# Patient Record
Sex: FEMALE | Race: WHITE | URBAN - METROPOLITAN AREA
[De-identification: names, ages, dates, MRNs, and addresses within clinical notes are randomized per-mention and may not be internally consistent; named-entity substitution may affect disease eponyms.]

---

## 2022-07-22 NOTE — IMPRESSION/PLAN
Impression: Tear film insufficiency of bilateral lacrimal glands: H04.123. Plan: Tearing and burning related to dryness. Recommend AT's at least 2-3 times per day or more OU.

## 2022-07-22 NOTE — IMPRESSION/PLAN
Impression: Bilateral nonexudative age-related macular degeneration, intermediate dry stage: H35.3132. *Previous history of injections Plan: Educated patient on condition and findings. Drusen/ RPE changes, no SRF OU. MAC OCT taken today. Continue AREDS vitamins. Recommend UV blocking sunglasses when outdoors, avoid smoking, and incorporating green leafy vegetables into diet. RTC with any changes/worsening in vision. Monitor every 6 months with MOCT/FUNDUS PHOTOS.

## 2022-07-22 NOTE — IMPRESSION/PLAN
Impression: History of falling: Z91.81. Plan: Patient reports history of falling and occasional feeling dizzy when standing up. Reports recent changes in medication. Discussed possible etiologies / BP? No ocular etiology noted that accounts for complaints. Symptoms are not eye or vision related. Recommend follow up with PCP for further evaluation to ensure not a side effect from medications vs. BP vs. other etiologies.

## 2023-01-31 NOTE — IMPRESSION/PLAN
Impression: Exdtve age-rel mclr degn, right eye, with actv chrdl neovas: H35.3211. *History of injections Plan: Educated patient on today's findings. Wet AMD noted today OD. MAC OCT taken today. Continue AREDS vitamins. Recommend UV blocking sunglasses when outdoors, avoid smoking, and incorporating green leafy vegetables into diet. Discussed the need to refer to retina for further evaluation. Patient to contact our office with any further changes, worsening or distortion in vision. 

Refer to retina for further evaluation

## 2023-01-31 NOTE — IMPRESSION/PLAN
Impression: Nexdtve age-related mclr degn, left eye, intermed dry stage: H35.3122. Plan: Drusen/ RPE changes, no SRF OS. MAC OCT taken today. Continue AREDS vitamins. Recommend UV blocking sunglasses when outdoors, avoid smoking, and incorporating green leafy vegetables into diet. Patient to contact our office with any changes, worsening or distortion in vision. Monitor with MAC OCT + PHOTOS.

## 2023-01-31 NOTE — IMPRESSION/PLAN
Impression: Vitreous membranes and strands, bilateral: H43.313. Plan: Retina is attached 360 degrees with no signs of any retinal holes, tears, or detachments observed on today's dilated examination. Patient to RTC ASAP if increase in floaters, flashes, or curtain or veil taking away vision. Monitor.

## 2023-02-07 NOTE — IMPRESSION/PLAN
Impression: Exudative age-related macular degeneration, right eye, with active choroidal neovascularization: H35.6628. Plan: Active CNVM OD - start [id number] avastin OD Avastin 1/3 OD administered today without complication. R/B/A discussed at length.  Follow-up in 4 weeks for avastin 2/3 OD

## 2023-02-07 NOTE — IMPRESSION/PLAN
Impression: Nexdtve age-related mclr degn, left eye, intermed dry stage: H35.3122. Plan: No evidence of CNVM on imaging or examination. Will observe for now. Return precautions emphasized. If any distortions or changes in vision, to call immediately. Amsler grid given Smoking cessation (not a smoker), AREDS2 vitamins, sunglasses on bright days.

## 2023-03-07 NOTE — IMPRESSION/PLAN
Impression: Exudative age-related macular degeneration, right eye, with active choroidal neovascularization: H35.6831. Plan: Active CNVM OD - start [id number] avastin OD Avastin 2/3 OD administered today without complication. R/B/A discussed at length.  Follow-up in 4 weeks for avastin 3/3 OD

## 2023-05-10 NOTE — IMPRESSION/PLAN
Impression: Exudative age-related macular degeneration, right eye, with active choroidal neovascularization: H35.6186. Plan: Active CNVM OD - start [id number] avastin OD, injected today Avastin 3/3 OD administered today without complication. R/B/A discussed at length.

## 2023-06-09 ENCOUNTER — PROCEDURE (OUTPATIENT)
Dept: URBAN - METROPOLITAN AREA CLINIC 51 | Facility: CLINIC | Age: 88
End: 2023-06-09
Payer: MEDICARE

## 2023-06-09 DIAGNOSIS — H35.3211 EXUDATIVE AGE-RELATED MACULAR DEGENERATION, RIGHT EYE, WITH ACTIVE CHOROIDAL NEOVASCULARIZATION: Primary | ICD-10-CM

## 2023-06-09 PROCEDURE — 92134 CPTRZ OPH DX IMG PST SGM RTA: CPT | Performed by: OPHTHALMOLOGY

## 2023-06-09 PROCEDURE — 67028 INJECTION EYE DRUG: CPT | Performed by: OPHTHALMOLOGY

## 2023-06-09 ASSESSMENT — INTRAOCULAR PRESSURE
OS: 13
OD: 10

## 2023-06-09 NOTE — IMPRESSION/PLAN
Impression: Exudative age-related macular degeneration, right eye, with active choroidal neovascularization: H35.4917. Plan: Active CNVM OD  - start [id number] avastin OD Avastin OD administered today without complication. R/B/A discussed at length.  Follow-up in 4 weeks for a dilated exam, possible FA/ICG, sooner PRN

## 2023-07-11 ENCOUNTER — OFFICE VISIT (OUTPATIENT)
Dept: URBAN - METROPOLITAN AREA CLINIC 51 | Facility: CLINIC | Age: 88
End: 2023-07-11
Payer: MEDICARE

## 2023-07-11 DIAGNOSIS — H35.3211 EXUDATIVE AGE-RELATED MACULAR DEGENERATION, RIGHT EYE, WITH ACTIVE CHOROIDAL NEOVASCULARIZATION: Primary | ICD-10-CM

## 2023-07-11 PROCEDURE — 99214 OFFICE O/P EST MOD 30 MIN: CPT | Performed by: OPHTHALMOLOGY

## 2023-07-11 PROCEDURE — 67028 INJECTION EYE DRUG: CPT | Performed by: OPHTHALMOLOGY

## 2023-07-11 PROCEDURE — 92242 FLUORESCEIN&ICG ANGIOGRAPHY: CPT | Performed by: OPHTHALMOLOGY

## 2023-07-11 PROCEDURE — 92134 CPTRZ OPH DX IMG PST SGM RTA: CPT | Performed by: OPHTHALMOLOGY

## 2023-07-11 ASSESSMENT — INTRAOCULAR PRESSURE
OS: 11
OD: 10

## 2023-07-11 NOTE — IMPRESSION/PLAN
Impression: Exudative age-related macular degeneration, right eye, with active choroidal neovascularization: H35.4864. Plan: Active CNVM OD  - fluid resolved, extend to 6 weeks avastin OD Avastin 1/3 OD administered today without complication. R/B/A discussed at length. Follow-up in 6 weeks for avastin 2/3 OD.